# Patient Record
Sex: MALE | Race: WHITE
[De-identification: names, ages, dates, MRNs, and addresses within clinical notes are randomized per-mention and may not be internally consistent; named-entity substitution may affect disease eponyms.]

---

## 2020-09-25 ENCOUNTER — HOSPITAL ENCOUNTER (INPATIENT)
Dept: HOSPITAL 96 - M.ERS | Age: 85
LOS: 5 days | Discharge: HOME HEALTH | DRG: 193 | End: 2020-09-30
Attending: INTERNAL MEDICINE | Admitting: INTERNAL MEDICINE
Payer: COMMERCIAL

## 2020-09-25 VITALS — SYSTOLIC BLOOD PRESSURE: 96 MMHG | DIASTOLIC BLOOD PRESSURE: 40 MMHG

## 2020-09-25 VITALS — BODY MASS INDEX: 27.48 KG/M2 | WEIGHT: 181.3 LBS | HEIGHT: 67.99 IN

## 2020-09-25 VITALS — DIASTOLIC BLOOD PRESSURE: 76 MMHG | SYSTOLIC BLOOD PRESSURE: 145 MMHG

## 2020-09-25 VITALS — SYSTOLIC BLOOD PRESSURE: 98 MMHG | DIASTOLIC BLOOD PRESSURE: 38 MMHG

## 2020-09-25 VITALS — SYSTOLIC BLOOD PRESSURE: 80 MMHG | DIASTOLIC BLOOD PRESSURE: 50 MMHG

## 2020-09-25 VITALS — DIASTOLIC BLOOD PRESSURE: 41 MMHG | SYSTOLIC BLOOD PRESSURE: 103 MMHG

## 2020-09-25 VITALS — DIASTOLIC BLOOD PRESSURE: 45 MMHG | SYSTOLIC BLOOD PRESSURE: 90 MMHG

## 2020-09-25 VITALS — SYSTOLIC BLOOD PRESSURE: 114 MMHG | DIASTOLIC BLOOD PRESSURE: 59 MMHG

## 2020-09-25 VITALS — SYSTOLIC BLOOD PRESSURE: 75 MMHG | DIASTOLIC BLOOD PRESSURE: 30 MMHG

## 2020-09-25 VITALS — SYSTOLIC BLOOD PRESSURE: 110 MMHG | DIASTOLIC BLOOD PRESSURE: 48 MMHG

## 2020-09-25 DIAGNOSIS — I35.0: ICD-10-CM

## 2020-09-25 DIAGNOSIS — Z87.891: ICD-10-CM

## 2020-09-25 DIAGNOSIS — K21.9: ICD-10-CM

## 2020-09-25 DIAGNOSIS — Z95.5: ICD-10-CM

## 2020-09-25 DIAGNOSIS — Z98.49: ICD-10-CM

## 2020-09-25 DIAGNOSIS — J43.9: ICD-10-CM

## 2020-09-25 DIAGNOSIS — N18.9: ICD-10-CM

## 2020-09-25 DIAGNOSIS — Z82.49: ICD-10-CM

## 2020-09-25 DIAGNOSIS — N17.0: ICD-10-CM

## 2020-09-25 DIAGNOSIS — Z95.1: ICD-10-CM

## 2020-09-25 DIAGNOSIS — I95.9: ICD-10-CM

## 2020-09-25 DIAGNOSIS — R13.10: ICD-10-CM

## 2020-09-25 DIAGNOSIS — I13.0: ICD-10-CM

## 2020-09-25 DIAGNOSIS — Z20.828: ICD-10-CM

## 2020-09-25 DIAGNOSIS — I50.22: ICD-10-CM

## 2020-09-25 DIAGNOSIS — D64.9: ICD-10-CM

## 2020-09-25 DIAGNOSIS — R65.11: ICD-10-CM

## 2020-09-25 DIAGNOSIS — J96.20: ICD-10-CM

## 2020-09-25 DIAGNOSIS — E78.5: ICD-10-CM

## 2020-09-25 DIAGNOSIS — J15.9: Primary | ICD-10-CM

## 2020-09-25 DIAGNOSIS — I25.10: ICD-10-CM

## 2020-09-25 DIAGNOSIS — Z79.01: ICD-10-CM

## 2020-09-25 DIAGNOSIS — E87.2: ICD-10-CM

## 2020-09-25 LAB
ABSOLUTE BASOPHILS: 0.1 THOU/UL (ref 0–0.2)
ABSOLUTE EOSINOPHILS: 0.1 THOU/UL (ref 0–0.7)
ABSOLUTE MONOCYTES: 0.4 THOU/UL (ref 0–1.2)
ALBUMIN SERPL-MCNC: 3.5 G/DL (ref 3.4–5)
ALP SERPL-CCNC: 56 U/L (ref 46–116)
ALT SERPL-CCNC: 38 U/L (ref 30–65)
ANION GAP SERPL CALC-SCNC: 8 MMOL/L (ref 7–16)
APTT BLD: 24.1 SECONDS (ref 25–31.3)
AST SERPL-CCNC: 29 U/L (ref 15–37)
BASOPHILS NFR BLD AUTO: 1.3 %
BILIRUB SERPL-MCNC: 0.3 MG/DL
BUN SERPL-MCNC: 42 MG/DL (ref 7–18)
CALCIUM SERPL-MCNC: 9.3 MG/DL (ref 8.5–10.1)
CHLORIDE SERPL-SCNC: 104 MMOL/L (ref 98–107)
CO2 SERPL-SCNC: 26 MMOL/L (ref 21–32)
CREAT SERPL-MCNC: 2 MG/DL (ref 0.6–1.3)
EOSINOPHIL NFR BLD: 1.1 %
GLUCOSE SERPL-MCNC: 236 MG/DL (ref 70–99)
GRANULOCYTES NFR BLD MANUAL: 84.6 %
HCT VFR BLD CALC: 39 % (ref 42–52)
HGB BLD-MCNC: 13.5 GM/DL (ref 14–18)
INR PPP: 1
LYMPHOCYTES # BLD: 0.7 THOU/UL (ref 0.8–5.3)
LYMPHOCYTES NFR BLD AUTO: 8.6 %
MCH RBC QN AUTO: 31.8 PG (ref 26–34)
MCHC RBC AUTO-ENTMCNC: 34.5 G/DL (ref 28–37)
MCV RBC: 92.1 FL (ref 80–100)
MONOCYTES NFR BLD: 4.4 %
MPV: 7.6 FL. (ref 7.2–11.1)
NEUTROPHILS # BLD: 7.2 THOU/UL (ref 1.6–8.1)
NT-PRO BRAIN NAT PEPTIDE: 117 PG/ML (ref ?–300)
NUCLEATED RBCS: 0 /100WBC
PLATELET COUNT*: 150 THOU/UL (ref 150–400)
POTASSIUM SERPL-SCNC: 4 MMOL/L (ref 3.5–5.1)
PROT SERPL-MCNC: 6.5 G/DL (ref 6.4–8.2)
PROTHROMBIN TIME: 10.7 SECONDS (ref 9.2–11.5)
RBC # BLD AUTO: 4.24 MIL/UL (ref 4.5–6)
RDW-CV: 13.9 % (ref 10.5–14.5)
SODIUM SERPL-SCNC: 138 MMOL/L (ref 136–145)
WBC # BLD AUTO: 8.5 THOU/UL (ref 4–11)

## 2020-09-25 NOTE — CON
[address]  [address]                    CONSULTATION                  
_______________________________________________________________________________
 
Name:       LESLIEFLORENCIA VICTOR               Room:           [address] [place]  
M.R.#:  [id number]      Account #:      [id number]  
Admission:  09/25/20     Attend Phys:    CINTHYA Chin
Discharge:               Date of Birth:  [birth year]  
         Report #: [id number]
                                                                     [id number]  
_______________________________________________________________________________
THIS REPORT FOR:  //name//                      
 
cc:  ISAÍAS Olivas family physician/PCP 
     ISAÍAS - Deisy family physician/PCP                                        ~
THIS REPORT FOR:  //name//                      
 
CC: ISAÍAS physician/PCP
    Darrin Marmolejo
 
DATE OF SERVICE:  09/27/2020
 
 
REQUESTING PHYSICIAN:  Dr. Zhu.
 
REASON FOR CONSULTATION:  Acute kidney injury.
 
HISTORY OF PRESENT ILLNESS:  The patient is an 88-year-old gentleman, with
medical history significant for chronic kidney disease.  His baseline of renal
function unknown, coronary artery disease, he presents to the Emergency Room
with complaints of shortness of breath.  The patient states that symptoms
started a couple of days prior to admission.
 
His chest x-ray revealed presence of interstitial opacities in the right lower
lobe that likely represents interstitial pneumonia.  On admission, he also was
febrile, temperature was 38.3 maximum, so he was started on antibiotics.  He was
given Zithromax and Cefdinir.  His respiratory status is much improved, but his
creatinine went up from 2.0-2.5 and I was consulted.
 
His blood pressure was low in 70s on 09/25/2020 and 09/26/2020 and even today
with as low as 73/39 from a medication standpoint, he is not on any blood
pressure medication.
 
SOCIAL HISTORY:  He is very active.  No tobacco or alcohol abuse.
 
FAMILY HISTORY:  Noncontributory for this 88-year-old gentleman.
 
REVIEW OF SYSTEMS:  Positive for some weakness, but overall feels better.  No
shortness of breath, no chest pain, no ____, no nocturia.
 
PHYSICAL EXAMINATION:
GENERAL:  Awake, alert, oriented.
VITAL SIGNS:  Blood pressure 90/50, heart rate 95, afebrile.
HEENT:  Pupils are round.
NECK:  Supple.
LUNGS:  Clear.
CARDIOVASCULAR:  Regular rate.
 
 
 
[place] [address]                    CONSULTATION                  
_______________________________________________________________________________
 
Name:       FLORENCIA NELSON               Room:           [address] [place]  
[facility]#:  [id number]      Account #:      [id number]  
Admission:  09/25/20     Attend Phys:    CINTHYA Chin
Discharge:               Date of Birth:  [birth year]  
         Report #: [id number]
                                                                     [id number]  
_______________________________________________________________________________
ABDOMEN:  Soft.
LOWER EXTREMITIES:  No edema.
 
ASSESSMENT:
1.  Acute kidney injury due to underperfusion of the kidneys.  His blood
pressure was in 70s and 80s in the last couple of days.
2.  Pneumonia.
3.  Coronary artery disease.
 
PLAN:  IV fluids.  Place Olea.  Obtain renal ultrasound.  Follow up on the
labs.
 
 
 
 
 
 
 
 
 
 
 
 
 
 
 
 
 
 
 
 
 
 
 
 
 
 
 
 
 
 
 
 
 
                       
                                        By:                                
                 
D: 09/27/20 1349_______________________________________
T: 09/28/20 0059Alexandr ELENA Cook MD         /ELOISA

## 2020-09-25 NOTE — 2DMMODE
[place] [address]
Phone:  [phone] 2 D/M-MODE ECHOCARDIOGRAM     
_______________________________________________________________________________
 
Name:         LESLIEFLORENCIA VALENTE              Room:          [address] [place] 
M.JANAE.#:    [id number]     Account #:     [id number]  
Admission:    20    Attend Phys:   Darrin Marmolejo
Discharge:                Date of Birth: [birth year]  
Date of Service: 20 1539  Report #:      [id number]
        [id number]
_______________________________________________________________________________
THIS REPORT FOR:
 
cc:  FAM - No family physician/PCP 
     FAM - No family physician/PCP 
     Misael Lujan MD [facility]        
                                                                       ~
 
--------------- APPROVED REPORT --------------
 
 
Study performed:  2020 13:32:34
 
EXAM: Comprehensive 2D, Doppler, and color-flow 
Echocardiogram 
 
      BSA:         1.87
HR: 82 bpm BP:          110/48 mmHg 
 
Other Information 
Study Quality: Adequate
 
Indications
Aortic Valve Disease
Mitral Valve Disease
Dyspnea 
 
2D Dimensions
IVSd:  12.37 (7-11mm) LVOT Diam:  19.90 (18-24mm) 
LVDd:  47.86 mm  
PWd:  11.31 (7-11mm) Ascending Ao:  29.52 (22-36mm)
LVDs:  30.05 (25-40mm) 
Aortic Root:  29.08 mm 
 
Volumes
Left Atrial Volume (Systole) 
    LA ESV Index:  25.20 mL/m2
 
Aortic Valve
AoV Peak Kael.:  1.77 m/s 
AO Peak Gr.:  12.58 mmHg LVOT Max P.59 mmHg
AO Mean Gr.:  7.61 mmHg  LVOT Mean P.54 mmHg
    LVOT Max V:  1.70 m/s
AO V2 VTI:  32.71 cm  LVOT Mean V:  1.07 m/s
ANGELICA (VTI):  3.35 cm2  LVOT V1 VTI:  35.17 cm
 
Mitral Valve
 
 
[place] [address]
Phone:  [phone]                     2 D/M-MODE ECHOCARDIOGRAM     
_______________________________________________________________________________
 
Name:         FLORENCIA NELSON              Room:          [address] [place] 
M.R.#:    [id number]     Account #:     [id number]  
Admission:    20    Attend Phys:   Darrin Marmolejo
Discharge:                Date of Birth: [birth year]  
Date of Service: 20 1539  Report #:      [id number]
        [id number]
_______________________________________________________________________________
MV Mean Gr.:  5.81 mmHg  E/A Ratio:  0.80
    MV Decel. Time:  288.00 ms
MV E Max Kael.:  1.46 m/s 
MV PHT:  83.52 ms  
MVA (PHT):  2.63 cm2  
 
TDI
E/Lateral E':  16.22 E/Medial E':  20.86
   Medial E' Kael.:  0.07 m/s
   Lateral E' Kael.:  0.09 m/s
 
Pulmonary Valve
PV Peak Kael.:  1.28 m/s PV Peak Gr.:  6.54 mmHg
 
Tricuspid Valve
    RAP Estimate:  5.00 mmHg
TR Peak Gr.:  29.18 mmHg RVSP:  34.18 mmHg
    PA Pressure:  34.18 mmHg
 
Left Ventricle
The left ventricle is normal size. endocardium was not well 
visualized Mild concentric left ventricular hypertrophy. Left 
ventricular systolic function is normal. The left ventricular 
ejection fraction is within the normal range. Grade I - abnormal 
relaxation pattern.
 
Right Ventricle
The right ventricle is normal size. The right ventricular systolic 
function is normal.
 
Atria
The left atrium size is normal. Interatrial septum not well 
visualized. The right atrium size is normal.
 
Aortic Valve
Aortic valve is calcified. No aortic regurgitation is present. Mild 
aortic stenosis.
 
Mitral Valve
There is mitral annular calcification. Mitral valve leaflets are 
calcified. Trace mitral regurgitation. No evidence of mitral valve 
stenosis.
 
Tricuspid Valve
The tricuspid valve is normal in structure. Trace tricuspid 
regurgitation.
 
 
[place] [address]
Phone:  [phone]                     2 D/M-MODE ECHOCARDIOGRAM     
_______________________________________________________________________________
 
Name:         TYEFLORENCIA MEDINA              Room:          [address] [place] 
[facility]#:    [id number]     Account #:     [id number]  
Admission:    20    Attend Phys:   Darrin Marmolejo
Discharge:                Date of Birth: [birth year]  
Date of Service: 20 1539  Report #:      [id number]
        [id number]
_______________________________________________________________________________
 
Pulmonic Valve
Pulmonic valve is not well visualized. There is no pulmonic valvular 
regurgitation.
 
Great Vessels
The aortic root is normal in size. IVC is not well 
visualized.
 
Pericardium
There is no pericardial effusion.
 
<Conclusion>
Mild concentric left ventricular hypertrophy.
Left ventricular systolic function is normal.
The left ventricular ejection fraction is within the normal 
range.
Mild aortic stenosis.
 
 
 
 
 
 
 
 
 
 
 
 
 
 
 
 
 
 
 
 
 
 
 
 
 
 
  <ELECTRONICALLY SIGNED>
                                           By: Misael Lujan MD, [facility]      
  20     1539
D: 20 1539   _____________________________________
T: 20 1539   Misael Lujan MD, FACC        /INF

## 2020-09-25 NOTE — EKG
[place] [address]
Phone:  [phone]                     ELECTROCARDIOGRAM REPORT      
_______________________________________________________________________________
 
Name:         FLORENCIA NELSON              Room:          [address]    [place] 
.R.#:    [id number]     Account #:     [id number]  
Admission:    20    Attend Phys:   Darrin Marmolejo
Discharge:                Date of Birth: [birth year]  
Date of Service: 20 0505  Report #:      [id number]
        [id number]
_______________________________________________________________________________
THIS REPORT FOR:  //name//                      
 
                         [facility] ED
                                       
Test Date:    2020               Test Time:    05:05:48
Pat Name:     FLORENCIA NELSON           Department:   
Patient ID:   [id number]            Room:         [facility]
Gender:       M                        Technician:   
:          1932               Requested By: Mary Aguillon
Order Number: [id number] MD:   Misael Lujan
                                 Measurements
Intervals                              Axis          
Rate:         83                       P:            48
AK:           177                      QRS:          -47
QRSD:         136                      T:            45
QT:           378                                    
QTc:          445                                    
                           Interpretive Statements
Sinus rhythm
Probable left atrial enlargement
Right bundle branch block
Inferior infarct, old
Lateral leads are also involved
Baseline wander in lead(s) II,III,aVR,aVL,aVF,V1,V2,V5
No previous ECG available for comparison
Electronically Signed On 2020 10:13:00 CDT by Misael Lujan
[email or web address]
 
 
 
 
 
 
 
 
 
 
 
 
 
 
 
 
 
  <ELECTRONICALLY SIGNED>
                                           By: Misael Lujan MD, Located within [facility]      
  20     1013
D: 20 0505   _____________________________________
T: 20 0505   Misael Lujan MD, Located within [facility]        /EPI

## 2020-09-26 VITALS — SYSTOLIC BLOOD PRESSURE: 85 MMHG | DIASTOLIC BLOOD PRESSURE: 45 MMHG

## 2020-09-26 VITALS — SYSTOLIC BLOOD PRESSURE: 86 MMHG | DIASTOLIC BLOOD PRESSURE: 40 MMHG

## 2020-09-26 VITALS — SYSTOLIC BLOOD PRESSURE: 89 MMHG | DIASTOLIC BLOOD PRESSURE: 36 MMHG

## 2020-09-26 VITALS — DIASTOLIC BLOOD PRESSURE: 36 MMHG | SYSTOLIC BLOOD PRESSURE: 89 MMHG

## 2020-09-26 VITALS — SYSTOLIC BLOOD PRESSURE: 97 MMHG | DIASTOLIC BLOOD PRESSURE: 42 MMHG

## 2020-09-26 VITALS — DIASTOLIC BLOOD PRESSURE: 35 MMHG | SYSTOLIC BLOOD PRESSURE: 70 MMHG

## 2020-09-26 VITALS — DIASTOLIC BLOOD PRESSURE: 44 MMHG | SYSTOLIC BLOOD PRESSURE: 88 MMHG

## 2020-09-26 VITALS — DIASTOLIC BLOOD PRESSURE: 36 MMHG | SYSTOLIC BLOOD PRESSURE: 84 MMHG

## 2020-09-26 LAB
ABSOLUTE BASOPHILS: 0 THOU/UL (ref 0–0.2)
ABSOLUTE EOSINOPHILS: 0 THOU/UL (ref 0–0.7)
ABSOLUTE MONOCYTES: 0.5 THOU/UL (ref 0–1.2)
ANION GAP SERPL CALC-SCNC: 9 MMOL/L (ref 7–16)
BASOPHILS NFR BLD AUTO: 0.4 %
BILIRUB UR-MCNC: NEGATIVE MG/DL
BUN SERPL-MCNC: 45 MG/DL (ref 7–18)
CALCIUM SERPL-MCNC: 7.9 MG/DL (ref 8.5–10.1)
CHLORIDE SERPL-SCNC: 106 MMOL/L (ref 98–107)
CO2 SERPL-SCNC: 24 MMOL/L (ref 21–32)
COLOR UR: YELLOW
CREAT SERPL-MCNC: 2.4 MG/DL (ref 0.6–1.3)
EOSINOPHIL NFR BLD: 0.4 %
GLUCOSE SERPL-MCNC: 203 MG/DL (ref 70–99)
GRANULOCYTES NFR BLD MANUAL: 84.1 %
HCT VFR BLD CALC: 30.2 % (ref 42–52)
HGB BLD-MCNC: 10.7 GM/DL (ref 14–18)
KETONES UR STRIP-MCNC: NEGATIVE MG/DL
LYMPHOCYTES # BLD: 0.9 THOU/UL (ref 0.8–5.3)
LYMPHOCYTES NFR BLD AUTO: 9.7 %
MCH RBC QN AUTO: 32.6 PG (ref 26–34)
MCHC RBC AUTO-ENTMCNC: 35.2 G/DL (ref 28–37)
MCV RBC: 92.6 FL (ref 80–100)
MONOCYTES NFR BLD: 5.4 %
MPV: 8.3 FL. (ref 7.2–11.1)
NEUTROPHILS # BLD: 8 THOU/UL (ref 1.6–8.1)
NITRITE UR QL STRIP: NEGATIVE
NUCLEATED RBCS: 0 /100WBC
PLATELET COUNT*: 117 THOU/UL (ref 150–400)
POTASSIUM SERPL-SCNC: 4 MMOL/L (ref 3.5–5.1)
PROT UR QL STRIP: NEGATIVE
RBC # BLD AUTO: 3.27 MIL/UL (ref 4.5–6)
RBC # UR STRIP: NEGATIVE /UL
RDW-CV: 14.1 % (ref 10.5–14.5)
SODIUM SERPL-SCNC: 139 MMOL/L (ref 136–145)
SP GR UR STRIP: 1.02 (ref 1–1.03)
URINE CLARITY: CLEAR
URINE GLUCOSE-RANDOM: NEGATIVE
URINE LEUKOCYTES: NEGATIVE
UROBILINOGEN UR STRIP-ACNC: 0.2 E.U./DL (ref 0.2–1)
WBC # BLD AUTO: 9.5 THOU/UL (ref 4–11)

## 2020-09-26 NOTE — CON
[address]  [address]                    CONSULTATION                  
_______________________________________________________________________________
 
Name:       FLORENCIA NELSON               Room:           [address] [place]  
.R.#:  [id number]      Account #:      [id number]  
Admission:  09/25/20     Attend Phys:    CINTHYA Chin
Discharge:               Date of Birth:  [birth year]  
         Report #: [id number]
                                                                     [id number]  
_______________________________________________________________________________
THIS REPORT FOR:  //name//                      
 
cc:  ISAÍAS Olivas family physician/PCP 
     ISAÍAS - Deisy family physician/PCP                                        ~
THIS REPORT FOR:  //name//                      
 
CC: ISAÍAS physician/PCP
    Darrin Marmolejo
 
DATE OF SERVICE:  09/25/2020
 
 
CARDIOLOGY CONSULTATION
 
HISTORY OF PRESENT ILLNESS:  The patient is an 88-year-old single white male who
I was asked to see in the hospital today after he complained of being short of
breath.  The patient lives in [place], [place].  He apparently had 4-vessel
bypass surgery in [place] in 1970.  He has had many stents since that time. 
His last stent was several years ago.  He continues to see a cardiologist and
does a treadmill every couple of years.  He stays very active including skiing. 
He drove from [place] to [place] a couple of days ago to visit his son. 
For the past 24 hours, he has been short of breath and coughing.  He notes his
heart rate was increased.  He denied chest pain, syncope.  He has had no fever. 
He does have occasional swelling of his feet.  Cardiology consultation
requested.
 
PAST MEDICAL HISTORY:  Circumcision, cataract extraction.  He has no history of
hypertension.
 
MEDICATIONS:  On admission consist of Plavix, Lasix, Zocor.
 
ALLERGIES:  He has no known drug allergies.
 
FAMILY HISTORY:  His father had heart attack.
 
SOCIAL HISTORY:  He is single.  He is retired, quit smoking years ago.  No
alcohol abuse.
 
REVIEW OF SYSTEMS:  No history of stroke, asthma, peptic ulcer disease, liver
disease, chronic kidney disease.  No cancer other than skin cancer.  No
psychiatric illness.  No chronic skin condition.
 
PHYSICAL EXAMINATION:
GENERAL:  Revealed an elderly male, lying in bed.  He appeared in no distress.
VITAL SIGNS:  He had a blood pressure of 130/60, pulse is 80.  He is afebrile.
HEENT:  He is anicteric.  Conjunctivae pink.  Mucous membranes moist.
 
 
 
[place] [address]                    CONSULTATION                  
_______________________________________________________________________________
 
Name:       FLORENCIA NELSON               Room:           [address]#:  [id number]      Account #:      [id number]  
Admission:  09/25/20     Attend Phys:    CINTHYA Chin
Discharge:               Date of Birth:  [birth year]  
         Report #: [id number]
                                                                     [id number]  
_______________________________________________________________________________
NECK:  Veins nondistended.  No carotid bruits.  Bilateral systolic murmur noted
in the carotids.  Neck was supple.
CHEST:  Clear to auscultation.
CARDIOVASCULAR:  Regular rate and rhythm, grade 3 systolic ejection murmur at
left sternal border.
ABDOMEN:  Soft.
EXTREMITIES:  Had trace edema.  Dorsalis pedis pulse 1+ bilaterally.
SKIN:  Cool and dry.
LYMPH:  No adenopathy.
 
RADIOLOGICAL DATA:  His ECG on admission showed sinus rhythm, right bundle
branch block, evidence of previous inferior infarction.  His x-rays on
admission, he had a portable chest x-ray that showed interstitial infiltrates in
the right lower lobe consistent with pneumonia.  He had a V/Q scan of the chest
that showed low probability for pulmonary embolus.
 
LABORATORY DATA:  On admission, sodium 138, BUN 42, creatinine 2.0, glucose 236,
troponin 0.06.  .  White blood cell count 8.5, hematocrit 39.
 
IMPRESSION AND RECOMMENDATIONS:
1.  Pneumonia.
2.  Coronary artery disease.  Previous bypass surgery and stents.  I would
continue Plavix.
3.  Evidence of mild aortic stenosis.  Recommend echocardiogram.
4.  Hyperlipidemia.  The patient is on a statin drug.
5.  Chronic kidney disease.
 
 
 
 
 
 
 
 
 
 
 
 
 
 
 
 
 
 
<ELECTRONICALLY SIGNED>
                                        By:  Misael Lujan MD, FACC      
09/26/20     1017
D: 09/25/20 1515_______________________________________
T: 09/25/20 1529Davicki Lujan MD, FACC         /nt

## 2020-09-27 VITALS — DIASTOLIC BLOOD PRESSURE: 52 MMHG | SYSTOLIC BLOOD PRESSURE: 93 MMHG

## 2020-09-27 VITALS — DIASTOLIC BLOOD PRESSURE: 49 MMHG | SYSTOLIC BLOOD PRESSURE: 89 MMHG

## 2020-09-27 VITALS — SYSTOLIC BLOOD PRESSURE: 73 MMHG | DIASTOLIC BLOOD PRESSURE: 39 MMHG

## 2020-09-27 VITALS — SYSTOLIC BLOOD PRESSURE: 98 MMHG | DIASTOLIC BLOOD PRESSURE: 42 MMHG

## 2020-09-27 VITALS — SYSTOLIC BLOOD PRESSURE: 90 MMHG | DIASTOLIC BLOOD PRESSURE: 51 MMHG

## 2020-09-27 VITALS — DIASTOLIC BLOOD PRESSURE: 48 MMHG | SYSTOLIC BLOOD PRESSURE: 78 MMHG

## 2020-09-27 VITALS — DIASTOLIC BLOOD PRESSURE: 36 MMHG | SYSTOLIC BLOOD PRESSURE: 77 MMHG

## 2020-09-27 LAB
ABSOLUTE BASOPHILS: 0 THOU/UL (ref 0–0.2)
ABSOLUTE EOSINOPHILS: 0.1 THOU/UL (ref 0–0.7)
ABSOLUTE MONOCYTES: 0.6 THOU/UL (ref 0–1.2)
ALBUMIN SERPL-MCNC: 2.7 G/DL (ref 3.4–5)
ALP SERPL-CCNC: 37 U/L (ref 46–116)
ALT SERPL-CCNC: 26 U/L (ref 30–65)
ANION GAP SERPL CALC-SCNC: 10 MMOL/L (ref 7–16)
ANION GAP SERPL CALC-SCNC: 9 MMOL/L (ref 7–16)
AST SERPL-CCNC: 23 U/L (ref 15–37)
BASOPHILS NFR BLD AUTO: 0.3 %
BE: -7.9 MMOL/L
BILIRUB SERPL-MCNC: 0.2 MG/DL
BUN SERPL-MCNC: 37 MG/DL (ref 7–18)
BUN SERPL-MCNC: 44 MG/DL (ref 7–18)
CALCIUM SERPL-MCNC: 7.7 MG/DL (ref 8.5–10.1)
CALCIUM SERPL-MCNC: 8.5 MG/DL (ref 8.5–10.1)
CHLORIDE SERPL-SCNC: 109 MMOL/L (ref 98–107)
CHLORIDE SERPL-SCNC: 109 MMOL/L (ref 98–107)
CO2 SERPL-SCNC: 19 MMOL/L (ref 21–32)
CO2 SERPL-SCNC: 20 MMOL/L (ref 21–32)
CREAT SERPL-MCNC: 2.4 MG/DL (ref 0.6–1.3)
CREAT SERPL-MCNC: 2.5 MG/DL (ref 0.6–1.3)
EOSINOPHIL NFR BLD: 1 %
GAS PNL BLDV: 60.8 MMHG (ref 35–45)
GLUCOSE SERPL-MCNC: 146 MG/DL (ref 70–99)
GLUCOSE SERPL-MCNC: 233 MG/DL (ref 70–99)
GRANULOCYTES NFR BLD MANUAL: 77.7 %
HCT VFR BLD CALC: 29 % (ref 42–52)
HGB BLD-MCNC: 10.1 GM/DL (ref 14–18)
LYMPHOCYTES # BLD: 1.1 THOU/UL (ref 0.8–5.3)
LYMPHOCYTES NFR BLD AUTO: 13.1 %
MAGNESIUM SERPL-MCNC: 2.2 MG/DL (ref 1.8–2.4)
MCH RBC QN AUTO: 32.4 PG (ref 26–34)
MCHC RBC AUTO-ENTMCNC: 34.8 G/DL (ref 28–37)
MCV RBC: 93.3 FL (ref 80–100)
MONOCYTES NFR BLD: 7.9 %
MPV: 8.3 FL. (ref 7.2–11.1)
NEUTROPHILS # BLD: 6.3 THOU/UL (ref 1.6–8.1)
NUCLEATED RBCS: 0 /100WBC
PCO2 BLDV: 34.6 MMHG (ref 41–51)
PLATELET COUNT*: 118 THOU/UL (ref 150–400)
POTASSIUM SERPL-SCNC: 4.1 MMOL/L (ref 3.5–5.1)
POTASSIUM SERPL-SCNC: 4.2 MMOL/L (ref 3.5–5.1)
PROT SERPL-MCNC: 5.4 G/DL (ref 6.4–8.2)
RBC # BLD AUTO: 3.11 MIL/UL (ref 4.5–6)
RDW-CV: 14.1 % (ref 10.5–14.5)
SODIUM SERPL-SCNC: 138 MMOL/L (ref 136–145)
SODIUM SERPL-SCNC: 138 MMOL/L (ref 136–145)
WBC # BLD AUTO: 8.1 THOU/UL (ref 4–11)

## 2020-09-28 VITALS — DIASTOLIC BLOOD PRESSURE: 45 MMHG | SYSTOLIC BLOOD PRESSURE: 98 MMHG

## 2020-09-28 VITALS — SYSTOLIC BLOOD PRESSURE: 88 MMHG | DIASTOLIC BLOOD PRESSURE: 45 MMHG

## 2020-09-28 VITALS — SYSTOLIC BLOOD PRESSURE: 107 MMHG | DIASTOLIC BLOOD PRESSURE: 51 MMHG

## 2020-09-28 VITALS — DIASTOLIC BLOOD PRESSURE: 61 MMHG | SYSTOLIC BLOOD PRESSURE: 106 MMHG

## 2020-09-28 VITALS — DIASTOLIC BLOOD PRESSURE: 50 MMHG | SYSTOLIC BLOOD PRESSURE: 118 MMHG

## 2020-09-28 LAB
ALBUMIN SERPL-MCNC: 2.9 G/DL (ref 3.4–5)
ALP SERPL-CCNC: 45 U/L (ref 46–116)
ALT SERPL-CCNC: 27 U/L (ref 30–65)
ANION GAP SERPL CALC-SCNC: 13 MMOL/L (ref 7–16)
AST SERPL-CCNC: 17 U/L (ref 15–37)
BE: -9 MMOL/L
BILIRUB SERPL-MCNC: 0.4 MG/DL
BUN SERPL-MCNC: 40 MG/DL (ref 7–18)
CALCIUM SERPL-MCNC: 8.4 MG/DL (ref 8.5–10.1)
CHLORIDE SERPL-SCNC: 109 MMOL/L (ref 98–107)
CO2 SERPL-SCNC: 18 MMOL/L (ref 21–32)
CREAT SERPL-MCNC: 2.4 MG/DL (ref 0.6–1.3)
GLUCOSE SERPL-MCNC: 252 MG/DL (ref 70–99)
HCT VFR BLD CALC: 30.7 % (ref 42–52)
HGB BLD-MCNC: 10.6 GM/DL (ref 14–18)
MCH RBC QN AUTO: 32.3 PG (ref 26–34)
MCHC RBC AUTO-ENTMCNC: 34.5 G/DL (ref 28–37)
MCV RBC: 93.5 FL (ref 80–100)
MPV: 8.6 FL. (ref 7.2–11.1)
PCO2 BLD: 27.1 MMHG (ref 35–45)
PLATELET COUNT*: 135 THOU/UL (ref 150–400)
PO2 BLD: 91.3 MMHG (ref 75–100)
POTASSIUM SERPL-SCNC: 4.3 MMOL/L (ref 3.5–5.1)
PROT SERPL-MCNC: 5.9 G/DL (ref 6.4–8.2)
RBC # BLD AUTO: 3.29 MIL/UL (ref 4.5–6)
RDW-CV: 14.3 % (ref 10.5–14.5)
SODIUM SERPL-SCNC: 140 MMOL/L (ref 136–145)
WBC # BLD AUTO: 10.3 THOU/UL (ref 4–11)

## 2020-09-29 VITALS — SYSTOLIC BLOOD PRESSURE: 115 MMHG | DIASTOLIC BLOOD PRESSURE: 63 MMHG

## 2020-09-29 VITALS — DIASTOLIC BLOOD PRESSURE: 50 MMHG | SYSTOLIC BLOOD PRESSURE: 128 MMHG

## 2020-09-29 VITALS — DIASTOLIC BLOOD PRESSURE: 52 MMHG | SYSTOLIC BLOOD PRESSURE: 100 MMHG

## 2020-09-29 VITALS — SYSTOLIC BLOOD PRESSURE: 86 MMHG | DIASTOLIC BLOOD PRESSURE: 47 MMHG

## 2020-09-29 VITALS — SYSTOLIC BLOOD PRESSURE: 102 MMHG | DIASTOLIC BLOOD PRESSURE: 60 MMHG

## 2020-09-29 VITALS — DIASTOLIC BLOOD PRESSURE: 46 MMHG | SYSTOLIC BLOOD PRESSURE: 100 MMHG

## 2020-09-29 VITALS — DIASTOLIC BLOOD PRESSURE: 72 MMHG | SYSTOLIC BLOOD PRESSURE: 131 MMHG

## 2020-09-29 LAB
ABSOLUTE BASOPHILS: 0 THOU/UL (ref 0–0.2)
ABSOLUTE EOSINOPHILS: 0 THOU/UL (ref 0–0.7)
ABSOLUTE MONOCYTES: 0.2 THOU/UL (ref 0–1.2)
ALBUMIN SERPL-MCNC: 2.8 G/DL (ref 3.4–5)
ALBUMIN SERPL-MCNC: 2.9 G/DL (ref 3.4–5)
ALP SERPL-CCNC: 46 U/L (ref 46–116)
ALT SERPL-CCNC: 32 U/L (ref 30–65)
ANION GAP SERPL CALC-SCNC: 10 MMOL/L (ref 7–16)
ANION GAP SERPL CALC-SCNC: 12 MMOL/L (ref 7–16)
AST SERPL-CCNC: 38 U/L (ref 15–37)
BASOPHILS NFR BLD AUTO: 0.4 %
BILIRUB SERPL-MCNC: 0.4 MG/DL
BUN SERPL-MCNC: 38 MG/DL (ref 7–18)
BUN SERPL-MCNC: 38 MG/DL (ref 7–18)
CALCIUM SERPL-MCNC: 8.2 MG/DL (ref 8.5–10.1)
CALCIUM SERPL-MCNC: 8.6 MG/DL (ref 8.5–10.1)
CHLORIDE SERPL-SCNC: 107 MMOL/L (ref 98–107)
CHLORIDE SERPL-SCNC: 107 MMOL/L (ref 98–107)
CO2 SERPL-SCNC: 19 MMOL/L (ref 21–32)
CO2 SERPL-SCNC: 19 MMOL/L (ref 21–32)
CREAT SERPL-MCNC: 2.1 MG/DL (ref 0.6–1.3)
CREAT SERPL-MCNC: 2.1 MG/DL (ref 0.6–1.3)
EOSINOPHIL NFR BLD: 0.1 %
GLUCOSE SERPL-MCNC: 307 MG/DL (ref 70–99)
GLUCOSE SERPL-MCNC: 307 MG/DL (ref 70–99)
GRANULOCYTES NFR BLD MANUAL: 94.3 %
HCT VFR BLD CALC: 32.3 % (ref 42–52)
HGB BLD-MCNC: 11.1 GM/DL (ref 14–18)
LYMPHOCYTES # BLD: 0.3 THOU/UL (ref 0.8–5.3)
LYMPHOCYTES NFR BLD AUTO: 3.3 %
MAGNESIUM SERPL-MCNC: 2.2 MG/DL (ref 1.8–2.4)
MCH RBC QN AUTO: 32.3 PG (ref 26–34)
MCHC RBC AUTO-ENTMCNC: 34.3 G/DL (ref 28–37)
MCV RBC: 94.2 FL (ref 80–100)
MONOCYTES NFR BLD: 1.9 %
MPV: 8.7 FL. (ref 7.2–11.1)
NEUTROPHILS # BLD: 9.2 THOU/UL (ref 1.6–8.1)
NUCLEATED RBCS: 0 /100WBC
PHOSPHATE SERPL-MCNC: 4.1 MG/DL (ref 2.5–4.9)
PLATELET COUNT*: 151 THOU/UL (ref 150–400)
POTASSIUM SERPL-SCNC: 4.4 MMOL/L (ref 3.5–5.1)
POTASSIUM SERPL-SCNC: 4.5 MMOL/L (ref 3.5–5.1)
PROT SERPL-MCNC: 5.9 G/DL (ref 6.4–8.2)
RBC # BLD AUTO: 3.43 MIL/UL (ref 4.5–6)
RDW-CV: 14.2 % (ref 10.5–14.5)
SODIUM SERPL-SCNC: 136 MMOL/L (ref 136–145)
SODIUM SERPL-SCNC: 138 MMOL/L (ref 136–145)
WBC # BLD AUTO: 9.8 THOU/UL (ref 4–11)

## 2020-09-29 NOTE — CON
[address]  [address]                    CONSULTATION                  
_______________________________________________________________________________
 
Name:       LESLIEFLORENCIA VICTOR               Room:           [address] [place]  
M.R.#:  [id number]      Account #:      [id number]  
Admission:  09/25/20     Attend Phys:    CINTHYA Chin
Discharge:               Date of Birth:  [birth year]  
         Report #: [id number]
                                                                     [id number]  
_______________________________________________________________________________
THIS REPORT FOR:  //name//                      
 
cc:  ISA[facility]AS Olivas family physician/PCP 
     ISAÍAS - No family physician/PCP                                        ~
THIS REPORT FOR:  //name//                      
 
CC: [facility] physician/PCP
    Darrin Marmolejo
 
DATE OF SERVICE:  09/28/2020
 
 
REQUESTING PHYSICIAN:  Rusty Robertson MD
 
INDICATION FOR CONSULTATION:  Shortness of breath/respiratory failure.
 
HISTORY OF PRESENT ILLNESS:  An 88-year-old gentleman, past medical history
includes a remote history of smoking.  The patient did use to smoke a pack or
more a day, but he discontinued 30 years ago.  We do not have his baseline
creatinine available.  The patient presented to the Emergency Room on 09/25. 
The presentation was with an increase in his shortness of breath.  The patient
was having chills; however, did not have a fever, did report that he was having
an increase in cough as well, but there was not much sputum production.  The
patient did have evaluation by a chest x-ray performed.  His chest x-ray
primarily is consistent with obstructive lung disease as well as pulmonary
infiltrates.  I do not see any significant increase in pulmonary vascular
congestion on his chest x-rays.  The patient was also found to be in acute renal
failure.  His creatinine initially was 2.0 and since then has increased to the
range of 2.4-2.5.  Further, the patient's blood pressure has been on the lower
side and he has had arterial blood gases which show a significant metabolic
acidosis.
 
Overall, the patient now reports an improvement in his shortness of breath,
although he remains short of breath.  He also reports improvement in his cough. 
There is no swelling of lower extremities.  He does not have calf pain.  The
patient, however, does have dysphagia.  He also complains of significant
heartburn at times despite the fact that he is on a proton pump inhibitor.  The
patient answered to the negative for 12 questions for review of systems except
as mentioned above.  The patient's COVID-19 screen was negative.
 
PAST MEDICAL HISTORY:  Hypertension, mild aortic stenosis, cataract extraction,
circumcision.  His echocardiogram shows mild aortic stenosis, normal left
ventricular ejection fraction without significant elevation in right heart
pressures.  History of CABG and stents.
 
SOCIAL HISTORY:  There is an extensive history of smoking more than a pack a
 
 
 
[place] [address]                    CONSULTATION                  
_______________________________________________________________________________
 
Name:       LESLIEFLORENCIA VALENTE               Room:           [address]#:  [id number]      Account #:      [id number]  
Admission:  09/25/20     Attend Phys:    CINTHYA Chin
Discharge:               Date of Birth:  [birth year]  
         Report #: [id number]
                                                                     [id number]  
_______________________________________________________________________________
day, but discontinued more than 30 years ago.  No known history of heavy alcohol
use or illegal drug use.
 
CURRENT MEDICATIONS:  List in [facility] reviewed.
 
HOME MEDICATIONS:  List in [facility] reviewed.
 
FAMILY HISTORY:  Father had a heart attack.
 
PHYSICAL EXAMINATION:
GENERAL:  He is alert, awake and oriented, does not appear to be in any distress
at this time.
VITAL SIGNS:  Has a pulse of 90 and blood pressure of 98/45.  He is afebrile
with a temperature of 36.5.  His respiratory rate is 18.  He is saturating 96%. 
He was on 2 liters oxygen via nasal cannula.
HEENT:  Head is normocephalic and atraumatic.  Pupils are equal and reactive. 
There is no throat erythema.
NECK:  Does not show raised JVP, asymmetry, mass, or lymph nodes.
CHEST:  Symmetrical expansion on inspection and palpation.  On auscultation,
breath sounds are bilaterally equal, but decreased.  I do not hear any added
sounds.
HEART:  Regular.  There is no murmur.
ABDOMEN:  Soft and nontender.
EXTREMITIES:  Lower extremities show no edema, no calf tenderness.
SKIN:  Dry and intact.
NEUROLOGICAL:  Moves all extremities bilaterally equally and spontaneously with
no focal deficit identified.
 
LABORATORY DATA:  The patient's CBC, chemistries, coagulation studies and
arterial blood gases which show a metabolic acidosis in [facility] reviewed.  Also
as discussed above, COVID-19 screen negative.  Urinalysis unremarkable.  The
patient's chest x-rays are reviewed.  In summary, there are pulmonary
infiltrates.  These in fact appear to be improving over the last 3 days.  I do
not see any pulmonary vascular congestion.  There is some evidence of a
background obstructive lung disease.  He did have a V/Q scan as well, which is
low probability.  His renal ultrasound does not show any obstruction.
 
ASSESSMENT/PLAN:
1.  Acute respiratory insufficiency/shortness of breath.  In the background, the
patient appears to have an obstructive lung disease.  On top of this, the
patient now has developed pneumonia.  The patient's pneumonia is already getting
better per the chest x-rays.  However, it also appears that the patient is in
acute renal failure and is intravascularly volume depleted.  Secondary to
intravascular volume depletion, the patient has developed a metabolic acidosis. 
This by itself is leading to an increase in respiratory drive and contributing
to his complaint of shortness of breath.  I do not see any pulmonary vascular
 
 
 
[address]  [address]                    CONSULTATION                  
_______________________________________________________________________________
 
Name:       FLORENCIA NELSON               Room:           [address] [place]  
[facility]#:  [id number]      Account #:      [id number]  
Admission:  09/25/20     Attend Phys:    CINTHYA Chin
Discharge:               Date of Birth:  [birth year]  
         Report #: [id number]
                                                                     [id number]  
_______________________________________________________________________________
congestion on his chest x-rays.
2.  Pulmonary infiltrates/pneumonia.  This is already improving on current
antibiotics.  Continue with Zithromax and cefdinir as currently prescribed.
3.  Acute renal failure with intravascular volume depletion and metabolic
acidosis.  I recommend hydration with IV fluids.  I did review with Dr. Kenia Perdomo.  I feel that it is also reasonable to give him sodium bicarbonate, but the
primary therapy is to keep him well hydrated.  After discussion with Dr. Kenia Perdomo, I did order Ringer's lactate.  It is possible that the patient's sodium
rises as a result of administration of Ringer's lactate in combination with oral
sodium bicarbonate.  If that is the case, he certainly could be switched over to
hypotonic fluids tomorrow.  It is possible that there is a component of chronic
renal insufficiency in the background as well.  We do not have the patient's
baseline creatinine available; however, it appears likely that the patient's
current creatinine is elevated compared with his baseline.
4.  Chronic obstructive pulmonary disease exacerbation/possible underlying
bronchial asthma.  Acutely, the therapy is with corticosteroids as well as
nebulized bronchodilators.  Considering that a definite distinction between
chronic obstructive pulmonary disease and bronchial asthma is not possible at
this time, I would favor for now treating him with an inhaled corticosteroid as
well as Singulair long-term pending further evaluation as an outpatient.  The
patient may benefit from pulmonary function tests later as an outpatient to
assess further.
5.  Dysphagia/gastroesophageal reflux disease.  I agree with a GI consult.  I
also requested speech to evaluate with a swallow evaluation and consider a video
swallow.  Increased Protonix to b.i.d.
6.  Cough.  I would favor not suppressing his cough with a narcotic in trying to
treat the underlying etiologies and therefore, I discontinued codeine. 
Certainly, narcotics could be administered if needed for other reasons.
7.  Deep vein thrombosis prophylaxis.  It is noted that the patient is on Plavix
for a previous history of coronary artery disease.  I may consider adding
subcutaneous heparin as well.  I understand that the GI service is considering
an esophagogastroduodenoscopy tomorrow and therefore, I did not order now, but
will consider tomorrow.
 
 
 
 
 
 
 
 
 
 
 
<ELECTRONICALLY SIGNED>
                                        By:  David Simmons MD              
09/29/20     1321
D: 09/28/20 1706_______________________________________
T: 09/28/20 1923Anahed Simmons MD                 /nt

## 2020-09-29 NOTE — CON
[address]  [address]                    CONSULTATION                  
_______________________________________________________________________________
 
Name:       FLORENCIA NELSON               Room:           [address] [place]  
.R.#:  [id number]      Account #:      [id number]  
Admission:  09/25/20     Attend Phys:    CINTHYA Chin
Discharge:               Date of Birth:  [birth year]  
         Report #: [id number]
                                                                     [id number]  
_______________________________________________________________________________
THIS REPORT FOR:  //name//                      
 
cc:  ISAÍAS - No family physician/PCP 
     [facility] - No family physician/PCP                                        ~
THIS REPORT FOR:  //name//                      
 
CC: [facility] physician/PCP
    Darrin Marmolejo
 
DICTATED BY: Libia Sorto [facility]
 
DATE OF SERVICE:  09/29/2020
 
 
The patient has a PCP in [place].
 
Please note at the time of this dictation, the patient was seen and physically
examined by myself.
 
REASON FOR CONSULTATION:  Dysphagia.
 
HISTORY OF PRESENT ILLNESS:  This is an 88-year-old male who is here in the [facility] visiting his son.  He has been having some complaints of dysphagia,
which he states that it has been off and on for many years.  He does have a
longstanding history of acid reflux and a history of peptic ulcers years ago,
but has never had an EGD done.  He states on a nightly basis, he may be awakened
with acid burning in the back of his throat and a bad taste.  He has not been
taking anything for this prior to admission.  He also states he has never had a
colonoscopy either.  Since being in the hospital, he has been placed on
pantoprazole 40 mg b.i.d. and he states he seems the thing that is helping.  He
has not had any issues yesterday or last night or this morning, he ate a full
breakfast this a.m. as well.
 
ALLERGIES:  No known drug allergies.
 
MEDICATIONS:  From home include Plavix, furosemide, simvastatin, and eye drops.
 
PAST MEDICAL HISTORY:  Coronary artery disease and COPD.
 
PAST SURGICAL HISTORY:  He has had a CABG x 4 in the past.
 
FAMILY HISTORY:  Negative for any GI or female cancers.
 
SOCIAL HISTORY:  He lives in [place], here only visiting his son.  He used to
smoke 2-3 packs a day and quit about 30 years ago.  Denies any alcohol use.
 
 
 
 
[place] [address]                    CONSULTATION                  
_______________________________________________________________________________
 
Name:       FLORENCIA NELSON               Room:           [address]#:  [id number]      Account #:      [id number]  
Admission:  09/25/20     Attend Phys:    CINTHYA Chin
Discharge:               Date of Birth:  [birth year]  
         Report #: [id number]
                                                                     [id number]  
_______________________________________________________________________________
REVIEW OF SYSTEMS:  Twelve-point review of systems is essentially negative
except what is mentioned in the HPI.
 
PHYSICAL EXAMINATION:
VITAL SIGNS:  Temperature 36.7, pulse 90, respirations 20, blood pressure
102/60.
HEART:  Regular rate and rhythm.
LUNGS:  Diminished, but clear.
ABDOMEN:  Soft, positive bowel sounds in all 4 quadrants with no masses or
tenderness noted.
 
LABORATORY DATA:  White count is 9.8, hemoglobin is 11.1, platelets is 151.  GFR
is 30.  LFTs are completely normal.  The patient is scheduled to have a video
swallow done, but has not been obtained yet.
 
IMPRESSION:
1.  Dysphagia.
2.  Gastroesophageal reflux disease.
3.  Pneumonia.
4.  Anemia.
5.  Chronic kidney disease.
6.  Anticoagulant therapy, Plavix.
 
PLAN:
1.  Continue Protonix 40 mg b.i.d. and to go home on this.
2.  The patient does not want to have any procedures performed at this time.  He
was to wait until he gets back to [place].
3.  No further recommendations to be made, only to go home on some Protonix and
he will follow up with his PCP and is highly suggested that he get an EGD once
he gets home.
 
Thank you for allowing us to participate in this patient's care.  Please do not
hesitate to call with any questions in regard to this consult.
 
Agree with above assessment and plan by Libia Sorto.
 
 
 
 
 
 
 
 
 
<ELECTRONICALLY SIGNED>
                                        By:  Oleg Fischer MD         
09/29/20     1437
D: 09/29/20 0934_______________________________________
T: 09/29/20 1016Oleg Fischer MD            /nt

## 2020-09-30 VITALS — SYSTOLIC BLOOD PRESSURE: 132 MMHG | DIASTOLIC BLOOD PRESSURE: 69 MMHG

## 2020-09-30 VITALS — SYSTOLIC BLOOD PRESSURE: 100 MMHG | DIASTOLIC BLOOD PRESSURE: 49 MMHG

## 2020-09-30 VITALS — DIASTOLIC BLOOD PRESSURE: 45 MMHG | SYSTOLIC BLOOD PRESSURE: 105 MMHG

## 2020-09-30 VITALS — DIASTOLIC BLOOD PRESSURE: 49 MMHG | SYSTOLIC BLOOD PRESSURE: 100 MMHG

## 2020-09-30 LAB
ABSOLUTE MONOCYTES: 0.1 THOU/UL (ref 0–1.2)
ANION GAP SERPL CALC-SCNC: 10 MMOL/L (ref 7–16)
BUN SERPL-MCNC: 49 MG/DL (ref 7–18)
CALCIUM SERPL-MCNC: 9 MG/DL (ref 8.5–10.1)
CHLORIDE SERPL-SCNC: 108 MMOL/L (ref 98–107)
CO2 SERPL-SCNC: 20 MMOL/L (ref 21–32)
CREAT SERPL-MCNC: 2.2 MG/DL (ref 0.6–1.3)
GLUCOSE SERPL-MCNC: 266 MG/DL (ref 70–99)
GRANULOCYTES NFR BLD MANUAL: 90 %
HCT VFR BLD CALC: 29.1 % (ref 42–52)
HGB BLD-MCNC: 10.3 GM/DL (ref 14–18)
LYMPHOCYTES # BLD: 0.7 THOU/UL (ref 0.8–5.3)
LYMPHOCYTES NFR BLD AUTO: 7 %
MAGNESIUM SERPL-MCNC: 2.5 MG/DL (ref 1.8–2.4)
MCH RBC QN AUTO: 33 PG (ref 26–34)
MCHC RBC AUTO-ENTMCNC: 35.4 G/DL (ref 28–37)
MCV RBC: 93.2 FL (ref 80–100)
MONOCYTES NFR BLD: 1 %
MPV: 8 FL. (ref 7.2–11.1)
NEUTROPHILS # BLD: 9.2 THOU/UL (ref 1.6–8.1)
NEUTS BAND NFR BLD: 2 %
NUCLEATED RBCS: 0 /100WBC
PLATELET # BLD EST: ADEQUATE 10*3/UL
PLATELET COUNT*: 169 THOU/UL (ref 150–400)
POTASSIUM SERPL-SCNC: 4.9 MMOL/L (ref 3.5–5.1)
RBC # BLD AUTO: 3.12 MIL/UL (ref 4.5–6)
RBC MORPH BLD: NORMAL
RDW-CV: 14.3 % (ref 10.5–14.5)
SODIUM SERPL-SCNC: 138 MMOL/L (ref 136–145)
WBC # BLD AUTO: 10 THOU/UL (ref 4–11)

## 2020-10-29 ENCOUNTER — HOSPITAL ENCOUNTER (OUTPATIENT)
Dept: HOSPITAL 96 - M.LAB | Age: 85
End: 2020-10-29
Attending: NURSE PRACTITIONER
Payer: COMMERCIAL

## 2020-10-29 DIAGNOSIS — N18.32: Primary | ICD-10-CM

## 2020-10-29 LAB
ABSOLUTE BASOPHILS: 0.1 THOU/UL (ref 0–0.2)
ABSOLUTE EOSINOPHILS: 0.2 THOU/UL (ref 0–0.7)
ABSOLUTE MONOCYTES: 0.4 THOU/UL (ref 0–1.2)
ALBUMIN SERPL-MCNC: 3.3 G/DL (ref 3.4–5)
ANION GAP SERPL CALC-SCNC: 12 MMOL/L (ref 7–16)
BASOPHILS NFR BLD AUTO: 1.3 %
BUN SERPL-MCNC: 46 MG/DL (ref 7–18)
CALCIUM SERPL-MCNC: 8.4 MG/DL (ref 8.5–10.1)
CALCIUM SERPL-MCNC: 8.8 MG/DL (ref 8.5–10.1)
CHLORIDE SERPL-SCNC: 102 MMOL/L (ref 98–107)
CO2 SERPL-SCNC: 23 MMOL/L (ref 21–32)
CREAT SERPL-MCNC: 2.5 MG/DL (ref 0.6–1.3)
CREAT SERPL-MCNC: 2.5 MG/DL (ref 0.6–1.3)
EOSINOPHIL NFR BLD: 4.5 %
GLUCOSE SERPL-MCNC: 291 MG/DL (ref 70–99)
GRANULOCYTES NFR BLD MANUAL: 61.7 %
HCT VFR BLD CALC: 35.5 % (ref 42–52)
HGB BLD-MCNC: 12.2 GM/DL (ref 14–18)
LYMPHOCYTES # BLD: 1 THOU/UL (ref 0.8–5.3)
LYMPHOCYTES NFR BLD AUTO: 23.3 %
MCH RBC QN AUTO: 31.6 PG (ref 26–34)
MCHC RBC AUTO-ENTMCNC: 34.5 G/DL (ref 28–37)
MCV RBC: 91.6 FL (ref 80–100)
MONOCYTES NFR BLD: 9.2 %
MPV: 7.3 FL. (ref 7.2–11.1)
NEUTROPHILS # BLD: 2.6 THOU/UL (ref 1.6–8.1)
NUCLEATED RBCS: 0 /100WBC
PHOSPHATE SERPL-MCNC: 3.8 MG/DL (ref 2.5–4.9)
PHOSPHATE SERPL-MCNC: 3.8 MG/DL (ref 2.5–4.9)
PLATELET COUNT*: 133 THOU/UL (ref 150–400)
POTASSIUM SERPL-SCNC: 4.6 MMOL/L (ref 3.5–5.1)
RBC # BLD AUTO: 3.88 MIL/UL (ref 4.5–6)
RDW-CV: 14.9 % (ref 10.5–14.5)
SODIUM SERPL-SCNC: 137 MMOL/L (ref 136–145)
WBC # BLD AUTO: 4.2 THOU/UL (ref 4–11)

## 2020-10-30 LAB — PTH-INTACT SERPL-MCNC: 85 PG/ML (ref 15–65)
